# Patient Record
Sex: MALE | Race: WHITE | NOT HISPANIC OR LATINO | Employment: OTHER | URBAN - METROPOLITAN AREA
[De-identification: names, ages, dates, MRNs, and addresses within clinical notes are randomized per-mention and may not be internally consistent; named-entity substitution may affect disease eponyms.]

---

## 2019-07-29 ENCOUNTER — TELEPHONE (OUTPATIENT)
Dept: GASTROENTEROLOGY | Facility: CLINIC | Age: 84
End: 2019-07-29

## 2019-07-29 NOTE — TELEPHONE ENCOUNTER
Called pt and scheduled NP appointment for 11/18/19 at 11 am. Discussed with pt that Dr. Yanez is a consultant who will send them back to their general GI provider once their symptoms improved and plan of care is established. Pt was told the logistics of the appointment (arrival time, length of visit, total time spent at facility, and Tamika Garcia NP role). Pt was also told that Dr. Yanez will review their previous workup but may order additional test and perform her own procedures and that this may require an overnight stay at a local hot to complete the workup.

## 2019-11-15 ENCOUNTER — DOCUMENTATION ONLY (OUTPATIENT)
Dept: GASTROENTEROLOGY | Facility: CLINIC | Age: 84
End: 2019-11-15

## 2019-11-15 NOTE — PROGRESS NOTES
Record review.  Time: 10 minutes  PMH of anemia, chronic back pain, diabetes type 2, history of prostate cancer status post prostatectomy, HLD, HTN, kidney disease, CAD, CHIOMA, proximal AFib, restless leg syndrome    EGD 07/01/2019:  Long segment Munoz's with tongues of salmon-colored mucosa in the distal esophagus 34-40 cm from the teeth with squamous islands (Munoz's mucosa with focal high-grade dysplasia and ulceration)    Labs:  07/07/2019:  CBC RBC low 4.53, HGB low 12.5, RDW high 16.0 BMP BUN high 22, calcium low 8.5  06/24/2019:  CMP BUN high 26, creatinine high 1.54, GFR low 43, calcium low 8.5  06/20/2019:  CBC RBC low 4.14, HGB low 11.7, RDW high 16.3, CMP creatinine high 1.36, calcium low 8.2  06/04/2019:  CMP BUN high 30, albumin low 3.1 TSH normal

## 2019-11-18 ENCOUNTER — OFFICE VISIT (OUTPATIENT)
Dept: GASTROENTEROLOGY | Facility: CLINIC | Age: 84
End: 2019-11-18
Payer: MEDICARE

## 2019-11-18 ENCOUNTER — TELEPHONE (OUTPATIENT)
Dept: GASTROENTEROLOGY | Facility: CLINIC | Age: 84
End: 2019-11-18

## 2019-11-18 VITALS
HEIGHT: 70 IN | DIASTOLIC BLOOD PRESSURE: 67 MMHG | WEIGHT: 213.88 LBS | HEART RATE: 58 BPM | SYSTOLIC BLOOD PRESSURE: 155 MMHG | BODY MASS INDEX: 30.62 KG/M2

## 2019-11-18 DIAGNOSIS — R63.4 WEIGHT LOSS: ICD-10-CM

## 2019-11-18 DIAGNOSIS — R07.9 CHEST PAIN, UNSPECIFIED TYPE: ICD-10-CM

## 2019-11-18 DIAGNOSIS — K21.9 GASTROESOPHAGEAL REFLUX DISEASE, ESOPHAGITIS PRESENCE NOT SPECIFIED: ICD-10-CM

## 2019-11-18 DIAGNOSIS — K22.711 BARRETT'S ESOPHAGUS WITH HIGH GRADE DYSPLASIA: Primary | ICD-10-CM

## 2019-11-18 DIAGNOSIS — R10.9 ABDOMINAL PAIN, UNSPECIFIED ABDOMINAL LOCATION: ICD-10-CM

## 2019-11-18 PROCEDURE — 99999 PR PBB SHADOW E&M-EST. PATIENT-LVL IV: ICD-10-PCS | Mod: PBBFAC,,, | Performed by: NURSE PRACTITIONER

## 2019-11-18 PROCEDURE — 99204 OFFICE O/P NEW MOD 45 MIN: CPT | Mod: S$PBB,,, | Performed by: NURSE PRACTITIONER

## 2019-11-18 PROCEDURE — 99999 PR PBB SHADOW E&M-EST. PATIENT-LVL IV: CPT | Mod: PBBFAC,,, | Performed by: NURSE PRACTITIONER

## 2019-11-18 PROCEDURE — 99214 OFFICE O/P EST MOD 30 MIN: CPT | Mod: PBBFAC | Performed by: NURSE PRACTITIONER

## 2019-11-18 PROCEDURE — 99204 PR OFFICE/OUTPT VISIT, NEW, LEVL IV, 45-59 MIN: ICD-10-PCS | Mod: S$PBB,,, | Performed by: NURSE PRACTITIONER

## 2019-11-18 RX ORDER — FUROSEMIDE 40 MG/1
40 TABLET ORAL DAILY
Refills: 5 | COMMUNITY
Start: 2019-10-08

## 2019-11-18 RX ORDER — ISOSORBIDE MONONITRATE 30 MG/1
15 TABLET, EXTENDED RELEASE ORAL
COMMUNITY
Start: 2019-01-25 | End: 2020-01-20

## 2019-11-18 RX ORDER — TRAMADOL HYDROCHLORIDE 50 MG/1
TABLET ORAL
COMMUNITY
Start: 2019-06-12

## 2019-11-18 RX ORDER — HYDROCODONE BITARTRATE AND ACETAMINOPHEN 10; 325 MG/1; MG/1
TABLET ORAL
Refills: 0 | Status: ON HOLD | COMMUNITY
Start: 2019-09-20 | End: 2020-03-31 | Stop reason: SDUPTHER

## 2019-11-18 RX ORDER — ROSUVASTATIN CALCIUM 20 MG/1
TABLET, COATED ORAL
Refills: 11 | COMMUNITY
Start: 2019-09-18

## 2019-11-18 RX ORDER — ASPIRIN 81 MG/1
81 TABLET ORAL
COMMUNITY
Start: 2018-08-24

## 2019-11-18 RX ORDER — ROPINIROLE 2 MG/1
2 TABLET, FILM COATED ORAL
COMMUNITY
Start: 2018-08-07

## 2019-11-18 RX ORDER — AMLODIPINE BESYLATE 5 MG/1
TABLET ORAL
Refills: 5 | COMMUNITY
Start: 2019-11-01

## 2019-11-18 RX ORDER — GABAPENTIN 100 MG/1
CAPSULE ORAL
Refills: 5 | COMMUNITY
Start: 2019-09-09

## 2019-11-18 RX ORDER — LOSARTAN POTASSIUM 50 MG/1
TABLET ORAL
Refills: 3 | COMMUNITY
Start: 2019-11-08

## 2019-11-18 RX ORDER — FAMOTIDINE 20 MG/1
20 TABLET, FILM COATED ORAL
COMMUNITY
Start: 2018-08-07

## 2019-11-18 RX ORDER — ALBUTEROL SULFATE 0.83 MG/ML
2.5 SOLUTION RESPIRATORY (INHALATION)
COMMUNITY
Start: 2018-08-07

## 2019-11-18 RX ORDER — ROSUVASTATIN CALCIUM 20 MG/1
20 TABLET, COATED ORAL
COMMUNITY
Start: 2018-10-16

## 2019-11-18 RX ORDER — LEVETIRACETAM 250 MG/1
250 TABLET ORAL
COMMUNITY
Start: 2018-09-27

## 2019-11-18 RX ORDER — FERROUS SULFATE 325(65) MG
325 TABLET, DELAYED RELEASE (ENTERIC COATED) ORAL
COMMUNITY
Start: 2018-08-07

## 2019-11-18 RX ORDER — INSULIN GLARGINE 100 [IU]/ML
30 INJECTION, SOLUTION SUBCUTANEOUS DAILY
COMMUNITY
Start: 2018-12-05

## 2019-11-18 RX ORDER — AMIODARONE HYDROCHLORIDE 200 MG/1
TABLET ORAL
Refills: 0 | COMMUNITY
Start: 2019-10-01

## 2019-11-18 RX ORDER — HYDRALAZINE HYDROCHLORIDE 25 MG/1
25 TABLET, FILM COATED ORAL
COMMUNITY
Start: 2019-06-12

## 2019-11-18 RX ORDER — PANTOPRAZOLE SODIUM 40 MG/1
TABLET, DELAYED RELEASE ORAL
Refills: 11 | Status: ON HOLD | COMMUNITY
Start: 2019-11-01 | End: 2020-03-31 | Stop reason: SDUPTHER

## 2019-11-18 NOTE — PROGRESS NOTES
[facility] Consultation Note    Reason for Consult:    Chief Complaint   Patient presents with    Chest Pain    Abdominal Pain    Weight Loss         PCP:   Primary Doctor No   [address] [facility] / [place] [address]    Referring MD:  Alberto Carver, Do  [address] [facility] [place] [address]      HPI:  Dick Lu is a 85 y.o. male with a PMH of anemia, chronic back pain, diabetes type 2, history of prostate cancer status post prostatectomy, HLD, HTN, kidney disease, CAD, CHIOMA, proximal AFib, restless leg syndrome referred to motility clinic for second opinion regarding the following problems:    GERD.  Well controlled with pantoprazole 40 mg daily and famotidine 20 mg BID.   Onset:sevearal yrs ago, got better after he stopped smoking about 40 yrs ago, and resolved 20 yrs ago  Retrosternal pyrosis:yes  Regurgitation:yes  Belching:yes  Frequency: typical s/s no longer occurring.atypical s/s frequently, rod nightly  Hoarseness:no  Cough:yes  Throat clearing:yes  Food Triggers:none noticed  Caffeine intake:1 cup coffee/morning; may have another on seldom occasions.   Does not sleep with head of the bed elevated.   Does not avoid eating prior to bedtime.      Munoz's esophagus with high grade dysplasia.     Chest pain. After meals. Not r/t exertion. Central chest sharp pain.   Onset: spring 2019  Frequency: prev occurring intermittently( few days monthly), has not occurred this past month       Triggers (cold fluids, caffeine, smoking, ETOH):none recognized  Consumes mostly soft foods and liquids:regular.   Negative cardiac workup:  Yes. Negative w/u (EKG, ECHO, Stress test)  apart from CHF    On isosorrbide dinitrate 30 mg daily  Has not tried peppermint oil, trazodone, botox, sildenafil, diltiazem, nifedapine    Abdominal pain. Lower abd. Cramping like gas. Better if passing gas. Has not tried any meds.     Weight loss.  Reports unintentional weight loss. On lasix. Feels his  weight flucuates  Lost (lb):  About 10 lbs  Period of time: 1 yr    Anemia. RBC, HGB low, RDW high. On iron 325 mg QOD. Possibly monitored by cardiologist. Pt states he had a colonoscopy in the past.     CHIOMA. RLS. On requip 2 mg nightly. Has sleep study in the past.     Chronic back pain. On tramadol 50 mg PRN. On gabapentin 100 mg nightly. Per pcp.     Denies  nausea, vomiting, early satiety,  bloating, diarrhea, constipation, BRBPR, melena, anxiety/depression.       Total visit time was 90 minutes, more than 50% of which was spent in face-to-face counseling with patient regarding symptoms, diagnostic results, prognosis, risks and benefits of treatment options, instructions for management, importance of compliance with chosen treatment options, risk factor reduction, stress reduction, coping strategies.      Previous Studies: (I spent 10 minutes reviewing records on 11/15/19).    EGD 07/01/2019:  Long segment Munoz's with tongues of salmon-colored mucosa in the distal esophagus 34-40 cm from the teeth with squamous islands (Munoz's mucosa with focal high-grade dysplasia and ulceration)     Labs:  07/07/2019:  CBC RBC low 4.53, HGB low 12.5, RDW high 16.0 BMP BUN high 22, calcium low 8.5  06/24/2019:  CMP BUN high 26, creatinine high 1.54, GFR low 43, calcium low 8.5  06/20/2019:  CBC RBC low 4.14, HGB low 11.7, RDW high 16.3, CMP creatinine high 1.36, calcium low 8.2  06/04/2019:  CMP BUN high 30, albumin low 3.1 TSH normal      ROS:  ROS   Constitutional: No fevers, no chills, no night sweats, +weight loss  ENT: +congestion, +rhinorrhea, +chronic sinus problems  CV: +chest pain, +palpitations  Pulm: +cough, +shortness of breath  Ophtho: No blurry vision, no eye redness  GI: see HPI  Derm: No rash  Heme: No lymphadenopathy, no bruising  MSK: +joint pain, +joint swelling, no Raynauds  : No dysuria, no frequent urination, no blood in urine  Endo: +hot or cold intolerance  Neuro: +dizziness, no syncope, no  seizure  Psych: No anxiety, no depression        Medical History: History reviewed. No pertinent past medical history.     Surgical History:   Past Surgical History:   Procedure Laterality Date    APPLICATION OF SPLINT  06/18/2014    HAND SURGERY      PROSTATECTOMY      RECONSTRUCTION OF MEDIAL PATELLOFEMORAL LIGAMENT OF RIGHT KNEE      TOTAL KNEE ARTHROPLASTY Right     TOTAL KNEE ARTHROPLASTY Left     left knee joint    UPPER GASTROINTESTINAL ENDOSCOPY          Family History:   Family History   Problem Relation Age of Onset    Glaucoma Mother     Prostate cancer Father     Stroke Father     Diabetes Mellitus Brother     Diabetes Maternal Grandmother     Cirrhosis Neg Hx     Celiac disease Neg Hx     Colon cancer Neg Hx     Colon polyps Neg Hx     Crohn's disease Neg Hx     Cystic fibrosis Neg Hx     Esophageal cancer Neg Hx     Hemochromatosis Neg Hx     Inflammatory bowel disease Neg Hx     Liver cancer Neg Hx     Irritable bowel syndrome Neg Hx     Liver disease Neg Hx     Rectal cancer Neg Hx     Stomach cancer Neg Hx     Ulcerative colitis Neg Hx     Lymphoma Neg Hx     Gennaro's disease Neg Hx     Tuberculosis Neg Hx     Rheum arthritis Neg Hx     Scleroderma Neg Hx     Multiple sclerosis Neg Hx     Melanoma Neg Hx     Lupus Neg Hx     Psoriasis Neg Hx     Skin cancer Neg Hx         Social History:   Social History     Socioeconomic History    Marital status:      Spouse name: Not on file    Number of children: Not on file    Years of education: Not on file    Highest education level: Not on file   Occupational History    Not on file   Social Needs    Financial resource strain: Not on file    Food insecurity:     Worry: Not on file     Inability: Not on file    Transportation needs:     Medical: Not on file     Non-medical: Not on file   Tobacco Use    Smoking status: Former Smoker   Substance and Sexual Activity    Alcohol use: Yes     Comment: 1-2 per  week    Drug use: Not on file    Sexual activity: Not on file   Lifestyle    Physical activity:     Days per week: Not on file     Minutes per session: Not on file    Stress: Not on file   Relationships    Social connections:     Talks on phone: Not on file     Gets together: Not on file     Attends Restorationism service: Not on file     Active member of club or organization: Not on file     Attends meetings of clubs or organizations: Not on file     Relationship status: Not on file   Other Topics Concern    Not on file   Social History Narrative    Not on file        Review of patient's allergies indicates:  No Known Allergies    Current Outpatient Medications   Medication Sig Dispense Refill    albuterol (PROVENTIL) 2.5 mg /3 mL (0.083 %) nebulizer solution 2.5 mg.      amiodarone (PACERONE) 200 MG Tab TAKE 1 ORAL TABLET DAILY  0    amLODIPine (NORVASC) 5 MG tablet   5    aspirin (ECOTRIN) 81 MG EC tablet Take 81 mg by mouth.      famotidine (PEPCID) 20 MG tablet Take 20 mg by mouth.      ferrous sulfate 325 (65 FE) MG EC tablet Take 325 mg by mouth.      furosemide (LASIX) 40 MG tablet Take 40 mg by mouth once daily.  5    gabapentin (NEURONTIN) 100 MG capsule 1 CAP(S) ORAL ONE HOUR BEFORE BEDITME  5    hydrALAZINE (APRESOLINE) 25 MG tablet Take 25 mg by mouth.      HYDROcodone-acetaminophen (NORCO)  mg per tablet TAKE 1/2 TO 1 TABLET THREE TIMES A DAY 5 DAYS MAY CAUSE DROWSINESS  0    insulin glargine (LANTUS) 100 unit/mL injection Inject into the skin.      isosorbide mononitrate (IMDUR) 30 MG 24 hr tablet Take 15 mg by mouth.      levETIRAcetam (KEPPRA) 250 MG Tab Take 250 mg by mouth.      losartan (COZAAR) 50 MG tablet   3    pantoprazole (PROTONIX) 40 MG tablet   11    rivaroxaban (XARELTO) 15 mg Tab Take 15 mg by mouth.      rOPINIRole (REQUIP) 2 MG tablet Take 2 mg by mouth.      rosuvastatin (CRESTOR) 20 MG tablet TAKE 1 TABLET (20 MG TOTAL) BY MOUTH NIGHTLY.  11     "rosuvastatin (CRESTOR) 20 MG tablet Take 20 mg by mouth.      traMADol (ULTRAM) 50 mg tablet        No current facility-administered medications for this visit.         Objective Findings:  Vital Signs:  BP (!) 155/67   Pulse (!) 58   Ht 5' 10" (1.778 m)   Wt 97 kg (213 lb 13.5 oz)   BMI 30.68 kg/m²   Body mass index is 30.68 kg/m².    Physical Exam:  General appearance: alert, cooperative, no distress  HENT: Normocephalic, atraumatic, neck symmetrical, no nasal discharge, Lips, mucosa, and tongue normal; teeth and gums normal  Eyes: conjunctivae/corneas clear, EOM's intact  Lungs: CTA bilaterally in anterior and posterior fields, no wheezes, no crackles.  Heart: Regular rate and rhythm, S1, S2 normal, no murmurs heard  Abdomen: soft, non tender, non distended with positive bowel sounds in all four quadrants. No hepatosplenomegaly, ascites, or mass  Extremities: extremities symmetric; no clubbing, cyanosis, or edema  Integument: Skin color, texture, turgor normal; no rashes; hair distrubution normal  Neurologic: Alert and oriented X 3, normal strength, normal coordination and gait  Psychiatric: no pressured speech; normal affect; no evidence of impaired cognition    Labs:  No results found for: WBC, HGB, HCT, MCV, PLT  No results found for: FERRITIN  No results found for: NA, K, CL, CO2, GLU, BUN, CREATININE, CALCIUM, PROT, ALBUMIN, BILITOT, ALKPHOS, AST, ALT  No results found for: TSH  No results found for: SEDRATE  No results found for: CRP  No results found for: LABA1C, HGBA1C        Assessment and Plan:  Dick Lu is a 85 y.o. male with a PMH of anemia, chronic back pain, diabetes type 2, history of prostate cancer status post prostatectomy, HLD, HTN, kidney disease, CAD, CHIOMA, proximal AFib, restless leg syndrome referred to motility clinic for second opinion regarding the following problems:    GERD. Typical symptoms have been well controlled with pantoprazole 40 mg daily and famotidine 20 mg BID for " several years.    Atypical symptoms occur frequently, rod at night.    Does not sleep with head of the bed elevated.   Does not avoid eating prior to bedtime.    -Cont PPI and H2RA    EGD in 7/2019 with Graham's esophagus with high grade dysplasia.   -Discussed graham's esophagus pathogenesis, course, treatment options. Provided handout.   -Dr. Yanez to message [facility] for EGD w EMR/ablation with Dr. Cobb    Chest pain. Cardiac work up EKG, ECHO, Stress test with CHF; no etiology for chest pain found.  Previously occurring intermittently (few days monthly), has not occurred this past month  On isosorbide dinitrate 30 mg daily   -Can try peppermints, peppermint oil, or altoids if symptoms recur  -Will consider esophageal manometry if symptoms recur   -Has not tried peppermint oil, trazodone, botox, sildenafil, diltiazem, nifedapine    Abdominal pain. Lower abd. Cramping like gas. Better if passing gas. Has not tried any meds.   -Defer to referring GI provider    Weight loss.  Lost 10 lbs over 1 year.  On lasix. Feels his weight flucuates  -Defer to referring GI provider    Anemia. RBC, HGB low, RDW high. On iron 325 mg QOD. Possibly monitored by cardiologist. Pt states he had a colonoscopy in the past.   -Defer to referring GI provider    CHIOMA. RLS. On requip 2 mg nightly. Has sleep study in the past.     Chronic back pain. On tramadol 50 mg PRN. On gabapentin 100 mg nightly. Per pcp.     Follow up in motility as needed,(ie  if chest pain recurs or if develops dysphagia)    Patient and family advised that Dr. Yanez and I act as a consult service and do not accept patients to be their primary GI providers. Discussed that the goal of our visit is to address relevant motility problems while deferring other GI problems as well as screening and surveillance to his primary GI provider.   Discussed that he needs to continue to follow with his local primary GI provider.  Discussed that we will complete his/her workup, clarify  diagnosis and attempt to optimize his/her symptoms with intention of him/her returning to referring GI provider for long term GI care.       1. Munoz's esophagus with high grade dysplasia    2. Chest pain, unspecified type    3. Gastroesophageal reflux disease, esophagitis presence not specified    4. Abdominal pain, unspecified abdominal location    5. Weight loss          Order summary:           Thank you so much for allowing me to participate in the care of Dick Lu          Tamika Mariee, ANDREA, FNP-C    I have personally reviewed history, performed physical exam, and educated the patient.  I have reviewed and agree with today's findings and the care plan outlined by Tamika Mariee NP. PLAN: EGD w EMR w Dr. Reza marte.      Rosalie Yanez MD

## 2019-11-18 NOTE — LETTER
November 19, 2019        Alberto Carver, DO  [address] [facility]  [place] [address]             [facility]  [address]  [place] [address]  Phone: [phone]  Fax: [phone]   Patient: Dick Lu   MR Number: [id number]   YOB: 1934   Date of Visit: 11/18/2019       Dear Dr. Carver:    Thank you for referring Dick Lu to me for evaluation. Attached you will find relevant portions of my assessment and plan of care.    If you have questions, please do not hesitate to call me. I look forward to following Dick Lu along with you.    Sincerely,                  CC  No Recipients    Enclosure

## 2019-11-18 NOTE — PATIENT INSTRUCTIONS
We will contact our advanced endoscopy team to coordinate an EGD for ablation of the graham's esophagus.     You may take peppermint oil (3 drops diluted in 1/2 cup room temperature water), peppermint tea, or mints (ie altoids) three times daily before meals if the chest pain recurs.    Speak to your referring GI doctor about the abdominal pain, weight loss,  and anemia to determine if further work up (ie repeat colonoscopy) is needed.     Continue the pantoprazole and famotidine as prescribed.

## 2019-11-19 NOTE — TELEPHONE ENCOUNTER
Dr. Trevino,   This pt somehow got ref to me for esophageal issues, dysphagia.  He denies dysphagia and his chest pain resolved. He had graham's esophagus w HGD on EGD 7/2019.  Could you please perform EGD +/- EMR/ablation ASAP to evaluate this?  Thanks  Rosalie Yanez

## 2019-11-19 NOTE — TELEPHONE ENCOUNTER
MD Agnela Modi MA   Caller: Unspecified (Yesterday,  6:16 PM)             Patient need EGD with

## 2019-11-21 ENCOUNTER — TELEPHONE (OUTPATIENT)
Dept: ENDOSCOPY | Facility: HOSPITAL | Age: 84
End: 2019-11-21

## 2019-11-21 DIAGNOSIS — K22.711 BARRETT'S ESOPHAGUS WITH HIGH GRADE DYSPLASIA: Primary | ICD-10-CM

## 2019-11-21 NOTE — TELEPHONE ENCOUNTER
Patient need EGD with possible RFA vs EMR for Munoz's with HGD. Main campus.       Please sign order

## 2019-11-22 ENCOUNTER — TELEPHONE (OUTPATIENT)
Dept: ENDOSCOPY | Facility: HOSPITAL | Age: 84
End: 2019-11-22

## 2019-12-02 ENCOUNTER — TELEPHONE (OUTPATIENT)
Dept: ENDOSCOPY | Facility: HOSPITAL | Age: 84
End: 2019-12-02

## 2019-12-02 NOTE — TELEPHONE ENCOUNTER
----- Message from Eric Newman sent at 12/2/2019  9:25 AM CST -----  Contact: Patient @ [phone]  Patient returning a missed call regarding scheduling  ( system did not allow scheduling ) pls call

## 2019-12-05 ENCOUNTER — TELEPHONE (OUTPATIENT)
Dept: ENDOSCOPY | Facility: HOSPITAL | Age: 84
End: 2019-12-05

## 2019-12-06 ENCOUNTER — TELEPHONE (OUTPATIENT)
Dept: ENDOSCOPY | Facility: HOSPITAL | Age: 84
End: 2019-12-06

## 2019-12-06 NOTE — TELEPHONE ENCOUNTER
----- Message from Xavier Sullivan sent at 12/6/2019 12:01 PM CST -----  Contact: pt: [phone]  Pt is returning call to Lucy     Please contact pt: [phone]

## 2019-12-06 NOTE — TELEPHONE ENCOUNTER
----- Message from Carmel Lindo sent at 12/6/2019  9:22 AM CST -----  Contact: pt   Lucy -- pt called to schedule his EDG.  Call back # [phone].

## 2019-12-06 NOTE — TELEPHONE ENCOUNTER
----- Message from Xavier Sullivan sent at 12/6/2019 12:01 PM CST -----  Contact: pt: [phone]  Pt is returning call to Lucy     Please contact pt: [phone]

## 2020-02-05 ENCOUNTER — TELEPHONE (OUTPATIENT)
Dept: ENDOSCOPY | Facility: HOSPITAL | Age: 85
End: 2020-02-05

## 2020-02-05 NOTE — TELEPHONE ENCOUNTER
----- Message from Telma Pope sent at 2/5/2020  4:29 PM CST -----  Contact: Jaylin( Dr. Alberto Carver [facility])  Dr. Carver would like for Dr. Cobb to contact him concerning the patient. He can be reached at [phone]

## 2020-02-06 ENCOUNTER — TELEPHONE (OUTPATIENT)
Dept: ENDOSCOPY | Facility: HOSPITAL | Age: 85
End: 2020-02-06

## 2020-02-06 NOTE — TELEPHONE ENCOUNTER
Spoke with patient and wife. EGD scheduled for 2/24 at 10a pending ok to hold Xarelo. Reviewed prep instructions. Mr and Mrs Lu verbalized understanding.

## 2020-02-07 ENCOUNTER — TELEPHONE (OUTPATIENT)
Dept: ENDOSCOPY | Facility: HOSPITAL | Age: 85
End: 2020-02-07

## 2020-02-07 NOTE — TELEPHONE ENCOUNTER
Refaxed Dr Dick Ball at corrected fax number [phone] requesting permission to hold Xarelto for EGD 2/24/20.

## 2020-02-07 NOTE — TELEPHONE ENCOUNTER
MD Angela Modi MA   Caller: Unspecified (2 days ago,  4:43 PM)             Unable to reach Dr Carver.   R

## 2020-02-07 NOTE — TELEPHONE ENCOUNTER
Spoke with patient about EGD/UEMR scheduled 2/24/20 at 1000.  States his cardiologist Dr Dick Ball/[facility] in [place], [place] oversees his Xarelto.  Spoke to staff w/Dr Ball (ph [phone]) and faxed them (fx [phone]) requesting permission to hold Xarelto for 2 days prior to procedure.  Will f/u w/patient after response.

## 2020-02-10 ENCOUNTER — TELEPHONE (OUTPATIENT)
Dept: ENDOSCOPY | Facility: HOSPITAL | Age: 85
End: 2020-02-10

## 2020-02-10 NOTE — TELEPHONE ENCOUNTER
Received fax from Dr Dick Ball with cardiology records and permission to hold Xarelto for 2 days prior to EGD/UEMR scheduled 2/24/20 at 1000.  Scanned to media tab.  Spoke with patient about instructions for procedure.  Instructions mailed.

## 2020-02-24 ENCOUNTER — ANESTHESIA (OUTPATIENT)
Dept: ENDOSCOPY | Facility: HOSPITAL | Age: 85
End: 2020-02-24
Payer: MEDICARE

## 2020-02-24 ENCOUNTER — HOSPITAL ENCOUNTER (OUTPATIENT)
Facility: HOSPITAL | Age: 85
Discharge: HOME | End: 2020-02-24
Attending: INTERNAL MEDICINE | Admitting: INTERNAL MEDICINE
Payer: MEDICARE

## 2020-02-24 ENCOUNTER — ANESTHESIA EVENT (OUTPATIENT)
Dept: ENDOSCOPY | Facility: HOSPITAL | Age: 85
End: 2020-02-24
Payer: MEDICARE

## 2020-02-24 VITALS
SYSTOLIC BLOOD PRESSURE: 192 MMHG | HEART RATE: 61 BPM | WEIGHT: 202 LBS | HEIGHT: 70 IN | DIASTOLIC BLOOD PRESSURE: 91 MMHG | BODY MASS INDEX: 28.92 KG/M2 | TEMPERATURE: 98 F | OXYGEN SATURATION: 94 % | RESPIRATION RATE: 20 BRPM

## 2020-02-24 DIAGNOSIS — K22.719 BARRETT'S ESOPHAGUS WITH DYSPLASIA: ICD-10-CM

## 2020-02-24 DIAGNOSIS — K22.711 BARRETT'S ESOPHAGUS WITH HIGH GRADE DYSPLASIA: Primary | ICD-10-CM

## 2020-02-24 LAB
POCT GLUCOSE: 139 MG/DL (ref 70–110)
POCT GLUCOSE: 159 MG/DL (ref 70–110)

## 2020-02-24 PROCEDURE — 82962 GLUCOSE BLOOD TEST: CPT | Mod: 91 | Performed by: INTERNAL MEDICINE

## 2020-02-24 PROCEDURE — 82962 GLUCOSE BLOOD TEST: CPT | Performed by: INTERNAL MEDICINE

## 2020-02-24 PROCEDURE — 43259 EGD US EXAM DUODENUM/JEJUNUM: CPT | Performed by: INTERNAL MEDICINE

## 2020-02-24 PROCEDURE — 43259 EGD US EXAM DUODENUM/JEJUNUM: CPT | Mod: ,,, | Performed by: INTERNAL MEDICINE

## 2020-02-24 PROCEDURE — 37000008 HC ANESTHESIA 1ST 15 MINUTES: Performed by: INTERNAL MEDICINE

## 2020-02-24 PROCEDURE — 37000009 HC ANESTHESIA EA ADD 15 MINS: Performed by: INTERNAL MEDICINE

## 2020-02-24 PROCEDURE — 43259 PR ENDOSCOPIC ULTRASOUND EXAM: ICD-10-PCS | Mod: ,,, | Performed by: INTERNAL MEDICINE

## 2020-02-24 PROCEDURE — D9220A PRA ANESTHESIA: ICD-10-PCS | Mod: CRNA,,, | Performed by: NURSE ANESTHETIST, CERTIFIED REGISTERED

## 2020-02-24 PROCEDURE — 63600175 PHARM REV CODE 636 W HCPCS: Performed by: NURSE ANESTHETIST, CERTIFIED REGISTERED

## 2020-02-24 PROCEDURE — D9220A PRA ANESTHESIA: Mod: ANES,,, | Performed by: ANESTHESIOLOGY

## 2020-02-24 PROCEDURE — D9220A PRA ANESTHESIA: ICD-10-PCS | Mod: ANES,,, | Performed by: ANESTHESIOLOGY

## 2020-02-24 PROCEDURE — D9220A PRA ANESTHESIA: Mod: CRNA,,, | Performed by: NURSE ANESTHETIST, CERTIFIED REGISTERED

## 2020-02-24 RX ORDER — SODIUM CHLORIDE 9 MG/ML
INJECTION, SOLUTION INTRAVENOUS CONTINUOUS
Status: DISCONTINUED | OUTPATIENT
Start: 2020-02-24 | End: 2020-02-24 | Stop reason: HOSPADM

## 2020-02-24 RX ORDER — SODIUM CHLORIDE 0.9 % (FLUSH) 0.9 %
10 SYRINGE (ML) INJECTION
Status: DISCONTINUED | OUTPATIENT
Start: 2020-02-24 | End: 2020-02-24 | Stop reason: HOSPADM

## 2020-02-24 RX ORDER — PROPOFOL 10 MG/ML
VIAL (ML) INTRAVENOUS CONTINUOUS PRN
Status: DISCONTINUED | OUTPATIENT
Start: 2020-02-24 | End: 2020-02-24

## 2020-02-24 RX ORDER — PROPOFOL 10 MG/ML
VIAL (ML) INTRAVENOUS
Status: DISCONTINUED | OUTPATIENT
Start: 2020-02-24 | End: 2020-02-24

## 2020-02-24 RX ORDER — LIDOCAINE HYDROCHLORIDE 20 MG/ML
INJECTION INTRAVENOUS
Status: DISCONTINUED | OUTPATIENT
Start: 2020-02-24 | End: 2020-02-24

## 2020-02-24 RX ORDER — SODIUM CHLORIDE 9 MG/ML
INJECTION, SOLUTION INTRAVENOUS CONTINUOUS PRN
Status: DISCONTINUED | OUTPATIENT
Start: 2020-02-24 | End: 2020-02-24

## 2020-02-24 RX ADMIN — SODIUM CHLORIDE: 0.9 INJECTION, SOLUTION INTRAVENOUS at 09:02

## 2020-02-24 RX ADMIN — PROPOFOL 50 MG: 10 INJECTION, EMULSION INTRAVENOUS at 09:02

## 2020-02-24 RX ADMIN — PROPOFOL 70 MG: 10 INJECTION, EMULSION INTRAVENOUS at 09:02

## 2020-02-24 RX ADMIN — LIDOCAINE HYDROCHLORIDE 60 MG: 20 INJECTION, SOLUTION INTRAVENOUS at 09:02

## 2020-02-24 RX ADMIN — PROPOFOL 100 MCG/KG/MIN: 10 INJECTION, EMULSION INTRAVENOUS at 09:02

## 2020-02-24 NOTE — ANESTHESIA PREPROCEDURE EVALUATION
02/24/2020  Dick Lu is a 86 y.o., male.  Past Medical History:   Diagnosis Date    Abdominal pain     AF (paroxysmal atrial fibrillation)     Anemia     Munoz's esophagus with high grade dysplasia     Bilateral edema of lower extremity     CAD (coronary artery disease)     CHF (congestive heart failure)     Chronic back pain     DM2 (diabetes mellitus, type 2)     Gastric regurgitation     GERD (gastroesophageal reflux disease)     Heartburn     HLD (hyperlipidemia)     HTN (hypertension)     Kidney disease     CHIOMA (obstructive sleep apnea)     Prostate CA     Restless leg syndrome     Weight loss, unintentional      Past Surgical History:   Procedure Laterality Date    APPLICATION OF SPLINT  06/18/2014    COLONOSCOPY      ESOPHAGOGASTRODUODENOSCOPY      HAND SURGERY      PROSTATECTOMY      RECONSTRUCTION OF MEDIAL PATELLOFEMORAL LIGAMENT OF RIGHT KNEE      TOTAL KNEE ARTHROPLASTY Right     TOTAL KNEE ARTHROPLASTY Left     left knee joint         Anesthesia Evaluation    I have reviewed the Patient Summary Reports.    I have reviewed the Nursing Notes.   I have reviewed the Medications.     Review of Systems  Anesthesia Hx:  No problems with previous Anesthesia  History of prior surgery of interest to airway management or planning: Denies Family Hx of Anesthesia complications.   Denies Personal Hx of Anesthesia complications.   Social:  Non-Smoker    Cardiovascular:   Exercise tolerance: good Hypertension CAD   CHF    Pulmonary:   Sleep Apnea    Renal/:   Chronic Renal Disease, CRI    Hepatic/GI:   GERD    Endocrine:   Diabetes, type 2        Physical Exam  General:  Well nourished    Airway/Jaw/Neck:  Airway Findings: Mouth Opening: Normal Tongue: Normal  General Airway Assessment: Adult  Mallampati: I  TM Distance: Normal, at least 6 cm      Dental:  Dental Findings:  Edentulous   Chest/Lungs:  Chest/Lungs Findings: Normal Respiratory Rate     Heart/Vascular:  Heart Findings: Rate: Normal  Rhythm: Regular Rhythm        Mental Status:  Mental Status Findings:  Cooperative, Alert and Oriented         Anesthesia Plan  Type of Anesthesia, risks & benefits discussed:  Anesthesia Type:  general  Patient's Preference:   Intra-op Monitoring Plan: standard ASA monitors  Intra-op Monitoring Plan Comments:   Post Op Pain Control Plan:   Post Op Pain Control Plan Comments:   Induction:   IV  Beta Blocker:         Informed Consent: Patient understands risks and agrees with Anesthesia plan.  Questions answered. Anesthesia consent signed with patient.  ASA Score: 3     Day of Surgery Review of History & Physical:    H&P update referred to the surgeon.         Ready For Surgery From Anesthesia Perspective.

## 2020-02-24 NOTE — ANESTHESIA POSTPROCEDURE EVALUATION
Anesthesia Post Evaluation    Patient: Dick Lu    Procedure(s) Performed: Procedure(s) (LRB):  EGD (ESOPHAGOGASTRODUODENOSCOPY)/poss RFA VS EMR (N/A)  ULTRASOUND, UPPER GI TRACT, ENDOSCOPIC    Final Anesthesia Type: general    Patient location during evaluation: [facility]  Patient participation: Yes- Able to Participate  Level of consciousness: awake and alert  Post-procedure vital signs: reviewed and stable  Pain management: adequate  Airway patency: patent    PONV status at discharge: No PONV  Anesthetic complications: no      Cardiovascular status: hemodynamically stable  Respiratory status: unassisted, spontaneous ventilation and room air  Hydration status: euvolemic  Follow-up not needed.          Vitals Value Taken Time   /91 2/24/2020 10:44 AM   Temp 36.7 °C (98 °F) 2/24/2020 10:44 AM   Pulse 61 2/24/2020 10:44 AM   Resp 20 2/24/2020 10:44 AM   SpO2 94 % 2/24/2020 10:44 AM         No case tracking events are documented in the log.      Pain/Zuly Score: Zuly Score: 10 (2/24/2020 10:44 AM)

## 2020-02-24 NOTE — DISCHARGE INSTRUCTIONS
Upper GI Endoscopy    Anesthesia: General Anesthesia     You are watched continuously during your procedure by your anesthesia provider.     Youre due to have surgery. During surgery, youll be given medicine called anesthesia or anesthetic. This will keep you comfortable and pain-free. Your anesthesia provider will use general anesthesia.  What is general anesthesia?  General anesthesia puts you into a state like deep sleep. It goes into the bloodstream (IV anesthetics), into the lungs (gas anesthetics), or both. You feel nothing during the procedure. You will not remember it. During the procedure, the anesthesia provider monitors you continuously. He or she checks your heart rate and rhythm, blood pressure, breathing, and blood oxygen.  · IV anesthetics. IV anesthetics are given through an IV line in your arm. Theyre often given first. This is so you are asleep before a gas anesthetic is started. Some kinds of IV anesthetics relieve pain. Others relax you. Your doctor will decide which kind is best in your case.  · Gas anesthetics. Gas anesthetics are breathed into the lungs. They are often used to keep you asleep. They can be given through a facemask or a tube placed in your larynx or trachea (breathing tube).  ¨ If you have a facemask, your anesthesia provider will most likely place it over your nose and mouth while youre still awake. Youll breathe oxygen through the mask as your IV anesthetic is started. Gas anesthetic may be added through the mask.  ¨ If you have a tube in the larynx or trachea, it will be inserted into your throat after youre asleep.  Anesthesia tools and medicines  You will likely have:  · IV anesthetics. These are put into an IV line into your bloodstream.  · Gas anesthetics. You breathe these anesthetics into your lungs, where they pass into your bloodstream.  · Pulse oximeter. This is a small clip that is attached to the end of your finger. This measures your blood oxygen  level.  · Electrocardiography leads (electrodes). These are small sticky pads that are placed on your chest. They record your heart rate and rhythm.  · Blood pressure cuff. This reads your blood pressure.  Risks and possible complications  General anesthesia has some risks. These include:  · Breathing problems  · Nausea and vomiting  · Sore throat or hoarseness (usually temporary)  · Allergic reaction to the anesthetic  · Irregular heartbeat (rare)  · Cardiac arrest (rare)   Anesthesia safety  · Follow all instructions you are given for how long not to eat or drink before your procedure.  · Be sure your doctor knows what medicines and drugs you take. This includes over-the-counter medicines, herbs, supplements, alcohol or other drugs. You will be asked when those were last taken.  · Have an adult family member or friend drive you home after the procedure.  · For the first 24 hours after your surgery:  ¨ Do not drive or use heavy equipment.  ¨ Do not make important decisions or sign legal documents. If important decisions or signing legal documents is necessary during the first 24 hours after surgery, have a trusted family member or spouse act on your behalf.  ¨ Avoid alcohol.  ¨ Have a responsible adult stay with you. He or she can watch for problems and help keep you safe.  Date Last Reviewed: 12/1/2016  © 1526-4945 [employer]. [address] [place] [address], [place] [address]. All rights reserved. This information is not intended as a substitute for professional medical care. Always follow your healthcare professional's instructions.         During endoscopy, a long, flexible tube is used to view the inside of your upper GI tract.      Upper GI endoscopy allows your healthcare provider to look directly into the beginning of your gastrointestinal (GI) tract. The esophagus, stomach, and duodenum (the first part of the small intestine) make up the upper GI tract.   Before the exam  Follow these and any other  instructions you are given before your endoscopy. If you dont follow the healthcare providers instructions carefully, the test may need to be canceled or done over:  · Don't eat or drink anything after midnight the night before your exam. If your exam is in the afternoon, drink only clear liquids in the morning. Don't eat or drink anything for 8 hours before the exam. In some cases, you may be able to take medicines with sips of water until 2 hours before the procedure. Speak with your healthcare provider about this.   · Bring your X-rays and any other test results you have.  · Because you will be sedated, arrange for an adult to drive you home after the exam.  · Tell your healthcare provider before the exam if you are taking any medicines or have any medical problems.  The procedure  Here is what to expect:  · You will lie on the endoscopy table. Usually patients lie on the left side.  · You will be monitored and given oxygen.  · Your throat may be numbed with a spray or gargle. You are given medicine through an intravenous (IV) line that will help you relax and remain comfortable. You may be awake or asleep during the procedure.  · The healthcare provider will put the endoscope in your mouth and down your esophagus. It is thinner than most pieces of food that you swallow. It will not affect your breathing. The medicine helps keep you from gagging.  · Air is put into your GI tract to expand it. It can make you burp.  · During the procedure, the healthcare provider can take biopsies (tissue samples), remove abnormalities, such as polyps, or treat abnormalities through a variety of devices placed through the endoscope. You will not feel this.   · The endoscope carries images of your upper GI tract to a video screen. If you are awake, you may be able to look at the images.  · After the procedure is done, you will rest for a time. An adult must drive you home.  When to call your healthcare provider  Contact your  healthcare provider if you have:  · Black or tarry stools, or blood in your stool  · Fever  · Pain in your belly that does not go away  · Nausea and vomiting, or vomiting blood   Date Last Reviewed: 7/1/2016 © 2000-2017 [employer]. [address] [place] [address]. All rights reserved. This information is not intended as a substitute for professional medical care. Always follow your healthcare professional's instructions.

## 2020-02-24 NOTE — TRANSFER OF CARE
"Anesthesia Transfer of Care Note    Patient: Dick Lu    Procedure(s) Performed: Procedure(s) (LRB):  EGD (ESOPHAGOGASTRODUODENOSCOPY)/poss RFA VS EMR (N/A)  ULTRASOUND, UPPER GI TRACT, ENDOSCOPIC    Patient location: [facility]    Anesthesia Type: general    Transport from OR: Transported from OR on room air with adequate spontaneous ventilation    Post pain: adequate analgesia    Post assessment: no apparent anesthetic complications and tolerated procedure well    Post vital signs: stable    Level of consciousness: awake and alert    Nausea/Vomiting: no nausea/vomiting    Complications: none          Last vitals:   Visit Vitals  BP (!) 160/78   Pulse (!) 57   Temp 36.1 °C (97 °F) (Temporal)   Resp 16   Ht 5' 10" (1.778 m)   Wt 91.6 kg (202 lb)   SpO2 (!) 91%   BMI 28.98 kg/m²     "

## 2020-02-24 NOTE — H&P
History & Physical - Short Stay  Gastroenterology      SUBJECTIVE:     Procedure: EGD and EUS    Chief Complaint/Indication for Procedure: Esophageal cancer    History of Present Illness:  Patient is a 86 y.o. 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    amiodarone (PACERONE) 200 MG Tab TAKE 1 ORAL TABLET DAILY    amLODIPine (NORVASC) 5 MG tablet     aspirin (ECOTRIN) 81 MG EC tablet Take 81 mg by mouth.    famotidine (PEPCID) 20 MG tablet Take 20 mg by mouth.    ferrous sulfate 325 (65 FE) MG EC tablet Take 325 mg by mouth.    furosemide (LASIX) 40 MG tablet Take 40 mg by mouth once daily.    gabapentin (NEURONTIN) 100 MG capsule 1 CAP(S) ORAL ONE HOUR BEFORE BEDITME    hydrALAZINE (APRESOLINE) 25 MG tablet Take 25 mg by mouth.    HYDROcodone-acetaminophen (NORCO)  mg per tablet TAKE 1/2 TO 1 TABLET THREE TIMES A DAY 5 DAYS MAY CAUSE DROWSINESS    insulin glargine (LANTUS) 100 unit/mL injection Inject 30 Units into the skin once daily. In the morning.    levETIRAcetam (KEPPRA) 250 MG Tab Take 250 mg by mouth.    losartan (COZAAR) 50 MG tablet     pantoprazole (PROTONIX) 40 MG tablet     rOPINIRole (REQUIP) 2 MG tablet Take 2 mg by mouth.    rosuvastatin (CRESTOR) 20 MG tablet TAKE 1 TABLET (20 MG TOTAL) BY MOUTH NIGHTLY.    rosuvastatin (CRESTOR) 20 MG tablet Take 20 mg by mouth.    traMADol (ULTRAM) 50 mg tablet     isosorbide mononitrate (IMDUR) 30 MG 24 hr tablet Take 15 mg by mouth.    rivaroxaban (XARELTO) 15 mg Tab Take 15 mg by mouth.       Review of patient's allergies indicates:  No Known Allergies     Past Medical History:   Diagnosis Date    Abdominal pain     AF (paroxysmal atrial fibrillation)     Anemia     Munoz's esophagus with high grade dysplasia     Bilateral edema of  lower extremity     CAD (coronary artery disease)     CHF (congestive heart failure)     Chronic back pain     DM2 (diabetes mellitus, type 2)     Gastric regurgitation     GERD (gastroesophageal reflux disease)     Heartburn     HLD (hyperlipidemia)     HTN (hypertension)     Kidney disease     CHIOMA (obstructive sleep apnea)     Prostate CA     Restless leg syndrome     Weight loss, unintentional      Past Surgical History:   Procedure Laterality Date    APPLICATION OF SPLINT  06/18/2014    COLONOSCOPY      ESOPHAGOGASTRODUODENOSCOPY      HAND SURGERY      PROSTATECTOMY      RECONSTRUCTION OF MEDIAL PATELLOFEMORAL LIGAMENT OF RIGHT KNEE      TOTAL KNEE ARTHROPLASTY Right     TOTAL KNEE ARTHROPLASTY Left     left knee joint     Family History   Problem Relation Age of Onset    Glaucoma Mother     Prostate cancer Father     Stroke Father     Diabetes Mellitus Brother     Diabetes Maternal Grandmother     Cirrhosis Neg Hx     Celiac disease Neg Hx     Colon cancer Neg Hx     Colon polyps Neg Hx     Crohn's disease Neg Hx     Cystic fibrosis Neg Hx     Esophageal cancer Neg Hx     Hemochromatosis Neg Hx     Inflammatory bowel disease Neg Hx     Liver cancer Neg Hx     Irritable bowel syndrome Neg Hx     Liver disease Neg Hx     Rectal cancer Neg Hx     Stomach cancer Neg Hx     Ulcerative colitis Neg Hx     Lymphoma Neg Hx     Gennaro's disease Neg Hx     Tuberculosis Neg Hx     Rheum arthritis Neg Hx     Scleroderma Neg Hx     Multiple sclerosis Neg Hx     Melanoma Neg Hx     Lupus Neg Hx     Psoriasis Neg Hx     Skin cancer Neg Hx      Social History     Tobacco Use    Smoking status: Former Smoker   Substance Use Topics    Alcohol use: Yes     Comment: 1-2 per week    Drug use: Not on file       Review of Systems:  Respiratory: no cough or shortness of breath  Cardiovascular: no chest pain or palpitations    OBJECTIVE:     Vital Signs (Most Recent)  Temp: 97.9 °F  (36.6 °C) (02/24/20 0850)  Pulse: 60 (02/24/20 0850)  Resp: 16 (02/24/20 0850)  BP: (!) 199/79 (02/24/20 0850)  SpO2: (!) 94 % (02/24/20 0850)    Physical Exam:  General: well developed, well nourished  Lungs:  normal respiratory effort  Heart: regular rate, S1, S2 normal    Laboratory  CBC: No results for input(s): WBC, RBC, HGB, HCT, PLT, MCV, MCH, MCHC in the last 168 hours.  CMP: No results for input(s): GLU, CALCIUM, ALBUMIN, PROT, NA, K, CO2, CL, BUN, CREATININE, ALKPHOS, ALT, AST, BILITOT in the last 168 hours.  Coagulation: No results for input(s): LABPROT, INR, APTT in the last 168 hours.        Diagnostic Results:        ASSESSMENT/PLAN:     Esophageal cancer    Plan: EGD and EUS    Anesthesia Plan: MAC    ASA Grade: ASA 3 - Patient with moderate systemic disease with functional limitations     The impression and plan was discussed in detail with the patient. All questions have been answered and the patient voices understanding of our plan at this point. The risk of the procedure was discussed in detail which includes but not limited to bleeding, infection, perforation in some cases requiring surgery with its spectrum of complications.

## 2020-02-24 NOTE — ANESTHESIA RELEASE NOTE
"Anesthesia Release from PACU Note    Patient: Dick Lu    Procedure(s) Performed: Procedure(s) (LRB):  EGD (ESOPHAGOGASTRODUODENOSCOPY)/poss RFA VS EMR (N/A)  ULTRASOUND, UPPER GI TRACT, ENDOSCOPIC    Anesthesia type: general    Post pain: Adequate analgesia    Post assessment: no apparent anesthetic complications and tolerated procedure well    Last Vitals:   Visit Vitals  BP (!) 192/91   Pulse 61   Temp 36.7 °C (98 °F) (Temporal)   Resp 20   Ht 5' 10" (1.778 m)   Wt 91.6 kg (202 lb)   SpO2 (!) 94%   BMI 28.98 kg/m²       Post vital signs: stable    Level of consciousness: awake and alert     Nausea/Vomiting: no nausea/no vomiting    Complications: none    Airway Patency: patent    Respiratory: unassisted, spontaneous ventilation, room air    Cardiovascular: stable and blood pressure at baseline    Hydration: euvolemic  "

## 2020-02-24 NOTE — PLAN OF CARE
Discharge instructions reviewed with patient and family. Verbalizes understanding. Copies of instructions given to patient.  Tolerating po fluids. Denies pain or nausea. Safety maintained.

## 2020-02-24 NOTE — DISCHARGE SUMMARY
Discharge Summary/Instructions after an Endoscopic Procedure    Patient Name: Dick Lu  Patient MRN: [id number]  Patient YOB: 1934 Monday, February 24, 2020  Mac Cobb MD    RESTRICTIONS:  During your procedure today, you received medications for sedation.  These medications may affect your judgment, balance and coordination.  Therefore, for 24 hours, you have the following restrictions:     - DO NOT drive a car, operate machinery, make legal/financial decisions, sign important papers or drink alcohol.      ACTIVITY:  Today: no heavy lifting, straining or running due to procedural sedation/anesthesia.  The following day: return to full activity including work.    DIET:  Eat and drink normally unless instructed otherwise.     TREATMENT FOR COMMON SIDE EFFECTS:  - Mild abdominal pain, nausea, belching, bloating or excessive gas:  rest, eat lightly and use a heating pad.  - Sore Throat: treat with throat lozenges and/or gargle with warm salt water.  - Because air was used during the procedure, expelling large amounts of air from your rectum or belching is normal.  - If a bowel prep was taken, you may not have a bowel movement for 1-3 days.  This is normal.      SYMPTOMS TO WATCH FOR AND REPORT TO YOUR PHYSICIAN:  1. Abdominal pain or bloating, other than gas cramps.  2. Chest pain.  3. Back pain.  4. Signs of infection such as: chills or fever occurring within 24 hours after the procedure.  5. Rectal bleeding, which would show as bright red, maroon, or black stools. (A tablespoon of blood from the rectum is not serious, especially if hemorrhoids are present.)  6. Vomiting.  7. Weakness or dizziness.      GO DIRECTLY TO THE NEAREST EMERGENCY ROOM IF YOU HAVE ANY OF THE FOLLOWING:     Difficulty breathing              Chills and/or fever over 101 F   Persistent vomiting and/or vomiting blood   Severe abdominal pain   Severe chest pain   Black, tarry stools   Bleeding- more than one  tablespoon   Any other symptom or condition that you feel may need urgent attention    Your doctor recommends these additional instructions:  If any biopsies were taken, your doctors clinic will contact you in 1 to 2 weeks with any results.    - Discharge patient to home (ambulatory).   - [facility]. Schedule tentatively an Upper ESD.    For questions, problems or results please call your physician - Mac Cobb MD at Work:  [phone].    [facility], EMERGENCY ROOM PHONE NUMBER: [phone]    IF A COMPLICATION OR EMERGENCY SITUATION ARISES AND YOU ARE UNABLE TO REACH YOUR PHYSICIAN - GO DIRECTLY TO THE EMERGENCY ROOM.

## 2020-02-24 NOTE — [employer] PATIENT INSTRUCTIONS
Discharge Summary/Instructions after an Endoscopic Procedure  Patient Name: Dick Lu  Patient MRN: [id number]  Patient YOB: 1934 Monday, February 24, 2020  Mac Cobb MD  RESTRICTIONS:  During your procedure today, you received medications for sedation.  These   medications may affect your judgment, balance and coordination.  Therefore,   for 24 hours, you have the following restrictions:   - DO NOT drive a car, operate machinery, make legal/financial decisions,   sign important papers or drink alcohol.    ACTIVITY:  Today: no heavy lifting, straining or running due to procedural   sedation/anesthesia.  The following day: return to full activity including work.  DIET:  Eat and drink normally unless instructed otherwise.     TREATMENT FOR COMMON SIDE EFFECTS:  - Mild abdominal pain, nausea, belching, bloating or excessive gas:  rest,   eat lightly and use a heating pad.  - Sore Throat: treat with throat lozenges and/or gargle with warm salt   water.  - Because air was used during the procedure, expelling large amounts of air   from your rectum or belching is normal.  - If a bowel prep was taken, you may not have a bowel movement for 1-3 days.    This is normal.  SYMPTOMS TO WATCH FOR AND REPORT TO YOUR PHYSICIAN:  1. Abdominal pain or bloating, other than gas cramps.  2. Chest pain.  3. Back pain.  4. Signs of infection such as: chills or fever occurring within 24 hours   after the procedure.  5. Rectal bleeding, which would show as bright red, maroon, or black stools.   (A tablespoon of blood from the rectum is not serious, especially if   hemorrhoids are present.)  6. Vomiting.  7. Weakness or dizziness.  GO DIRECTLY TO THE NEAREST EMERGENCY ROOM IF YOU HAVE ANY OF THE FOLLOWING:      Difficulty breathing              Chills and/or fever over 101 F   Persistent vomiting and/or vomiting blood   Severe abdominal pain   Severe chest pain   Black, tarry stools   Bleeding- more than one  tablespoon   Any other symptom or condition that you feel may need urgent attention  Your doctor recommends these additional instructions:  If any biopsies were taken, your doctors clinic will contact you in 1 to 2   weeks with any results.  - Discharge patient to home (ambulatory).   - Present at the [facility] for possible ESD. Schedule tentatively an Upper ESD.  For questions, problems or results please call your physician - Mac Cobb MD at Work:  [phone].  [facility], EMERGENCY ROOM PHONE NUMBER: [phone]  IF A COMPLICATION OR EMERGENCY SITUATION ARISES AND YOU ARE UNABLE TO REACH   YOUR PHYSICIAN - GO DIRECTLY TO THE EMERGENCY ROOM.  Mac Cobb MD  2/24/2020 10:10:48 AM  This report has been verified and signed electronically.  [employer]

## 2020-03-02 ENCOUNTER — TELEPHONE (OUTPATIENT)
Dept: ENDOSCOPY | Facility: HOSPITAL | Age: 85
End: 2020-03-02

## 2020-03-02 ENCOUNTER — TELEPHONE (OUTPATIENT)
Dept: GASTROENTEROLOGY | Facility: CLINIC | Age: 85
End: 2020-03-02

## 2020-03-02 ENCOUNTER — TUMOR BOARD CONFERENCE (OUTPATIENT)
Dept: SURGERY | Facility: CLINIC | Age: 85
End: 2020-03-02

## 2020-03-02 DIAGNOSIS — C15.5 MALIGNANT NEOPLASM OF LOWER THIRD OF ESOPHAGUS: ICD-10-CM

## 2020-03-02 DIAGNOSIS — C15.9 MALIGNANT NEOPLASM OF ESOPHAGUS, UNSPECIFIED LOCATION: Primary | ICD-10-CM

## 2020-03-02 NOTE — PROGRESS NOTES
[facility] UGI MULTIDISCIPLINARY TUMOR BOARD  PATIENT REVIEW FORM   ____________________________________________________________    CLINIC #: [id number]  DATE: 3/2/2020    DIAGNOSIS: esophageal YELITZA    PRESENTER: Reza    PATIENT SUMMARY:   This 87 y/o gentleman with prior hx of prostate CA. 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 EGD with bx revealed lower 1/3 esophageal mass, bx proved YELITZA. Recent EUS found mass at 37-40cm, malignant appearing mass, staged uT1N0.    BOARD RECOMMENDATIONS:   Proceed with ESD    CONSULT NEEDED:     [] Surgery    [] Hem/Onc    [] Rad/Onc    [] Dietary                 [] Social Service    [] Psychology       [x] AES  [] Radiology     Clinical Stage: Tumor 2  Node(s) 0  Metastasis 0     GROUP STAGE:  [] O    [x] 1   [] IIA    [] IIB     [] IIIA     [] IIIB     [] IIIC    []IV                               [] Local recurrence     [] Regional recurrence     [] Distant recurrence Metastatic site(s): none         [x] [facility] Treatment Guidelines reviewed and care planned is consistent with guidelines.         (i.e., NCCN, NCI, PD, ACO, AUA, etc.)    PRESENTATION AT CANCER CONFERENCE:         [x] Prospective    [] Retrospective     [] Follow-Up

## 2020-03-03 NOTE — TELEPHONE ENCOUNTER
MD Angela Modi MA   Caller: Unspecified (Yesterday,  4:24 PM)             3 hours will be okay.   R

## 2020-03-06 ENCOUNTER — TELEPHONE (OUTPATIENT)
Dept: ENDOSCOPY | Facility: HOSPITAL | Age: 85
End: 2020-03-06

## 2020-03-10 ENCOUNTER — TELEPHONE (OUTPATIENT)
Dept: ENDOSCOPY | Facility: HOSPITAL | Age: 85
End: 2020-03-10

## 2020-03-10 NOTE — TELEPHONE ENCOUNTER
Spoke with patient about instructions for EGD/UESD scheduled 3/31/20 at 0900.  Instructions mailed.  He will hold Xarelto for 2 days prior to procedure as before with permission from Dr Madi Ball.

## 2020-03-22 PROBLEM — C15.5 MALIGNANT NEOPLASM OF LOWER THIRD OF ESOPHAGUS: Status: ACTIVE | Noted: 2020-03-02

## 2020-03-31 ENCOUNTER — HOSPITAL ENCOUNTER (OUTPATIENT)
Facility: HOSPITAL | Age: 85
Discharge: HOME | End: 2020-03-31
Attending: INTERNAL MEDICINE | Admitting: INTERNAL MEDICINE
Payer: MEDICARE

## 2020-03-31 ENCOUNTER — TELEPHONE (OUTPATIENT)
Dept: ENDOSCOPY | Facility: HOSPITAL | Age: 85
End: 2020-03-31

## 2020-03-31 ENCOUNTER — ANESTHESIA (OUTPATIENT)
Dept: ENDOSCOPY | Facility: HOSPITAL | Age: 85
End: 2020-03-31
Payer: MEDICARE

## 2020-03-31 ENCOUNTER — ANESTHESIA EVENT (OUTPATIENT)
Dept: ENDOSCOPY | Facility: HOSPITAL | Age: 85
End: 2020-03-31
Payer: MEDICARE

## 2020-03-31 VITALS
WEIGHT: 205 LBS | SYSTOLIC BLOOD PRESSURE: 113 MMHG | RESPIRATION RATE: 16 BRPM | OXYGEN SATURATION: 96 % | TEMPERATURE: 98 F | BODY MASS INDEX: 29.35 KG/M2 | HEIGHT: 70 IN | DIASTOLIC BLOOD PRESSURE: 57 MMHG | HEART RATE: 64 BPM

## 2020-03-31 DIAGNOSIS — C15.9 ESOPHAGEAL CANCER: ICD-10-CM

## 2020-03-31 DIAGNOSIS — C15.9 MALIGNANT NEOPLASM OF ESOPHAGUS, UNSPECIFIED LOCATION: Primary | ICD-10-CM

## 2020-03-31 DIAGNOSIS — C15.5 MALIGNANT NEOPLASM OF LOWER THIRD OF ESOPHAGUS: ICD-10-CM

## 2020-03-31 DIAGNOSIS — K22.719 BARRETT'S ESOPHAGUS WITH DYSPLASIA: Primary | ICD-10-CM

## 2020-03-31 DIAGNOSIS — C15.5 MALIGNANT NEOPLASM OF LOWER THIRD OF ESOPHAGUS: Primary | ICD-10-CM

## 2020-03-31 LAB
POCT GLUCOSE: 133 MG/DL (ref 70–110)
POCT GLUCOSE: 170 MG/DL (ref 70–110)

## 2020-03-31 PROCEDURE — 43499 UNLISTED PROCEDURE ESOPHAGUS: CPT | Performed by: INTERNAL MEDICINE

## 2020-03-31 PROCEDURE — D9220A PRA ANESTHESIA: ICD-10-PCS | Mod: CRNA,,, | Performed by: NURSE ANESTHETIST, CERTIFIED REGISTERED

## 2020-03-31 PROCEDURE — D9220A PRA ANESTHESIA: Mod: ANES,,, | Performed by: ANESTHESIOLOGY

## 2020-03-31 PROCEDURE — D9220A PRA ANESTHESIA: ICD-10-PCS | Mod: ANES,,, | Performed by: ANESTHESIOLOGY

## 2020-03-31 PROCEDURE — 88305 TISSUE EXAM BY PATHOLOGIST: CPT | Mod: 26,,, | Performed by: PATHOLOGY

## 2020-03-31 PROCEDURE — 27200967 HC COAGRASPER: Performed by: INTERNAL MEDICINE

## 2020-03-31 PROCEDURE — 27202363 HC INJECTION AGENT, SUBMUCOSAL, ANY: Performed by: INTERNAL MEDICINE

## 2020-03-31 PROCEDURE — 27201243: Performed by: INTERNAL MEDICINE

## 2020-03-31 PROCEDURE — 88341 IMHCHEM/IMCYTCHM EA ADD ANTB: CPT | Mod: 26,,, | Performed by: PATHOLOGY

## 2020-03-31 PROCEDURE — 88342 CHG IMMUNOCYTOCHEMISTRY: ICD-10-PCS | Mod: 26,,, | Performed by: PATHOLOGY

## 2020-03-31 PROCEDURE — 27201028 HC NEEDLE, SCLERO: Performed by: INTERNAL MEDICINE

## 2020-03-31 PROCEDURE — 82962 GLUCOSE BLOOD TEST: CPT | Mod: 91 | Performed by: INTERNAL MEDICINE

## 2020-03-31 PROCEDURE — 63600175 PHARM REV CODE 636 W HCPCS: Performed by: NURSE ANESTHETIST, CERTIFIED REGISTERED

## 2020-03-31 PROCEDURE — 63600175 PHARM REV CODE 636 W HCPCS: Mod: JG | Performed by: INTERNAL MEDICINE

## 2020-03-31 PROCEDURE — 43999 UNLISTED PROCEDURE STOMACH: CPT | Mod: ,,, | Performed by: INTERNAL MEDICINE

## 2020-03-31 PROCEDURE — 27202299 HC NEEDLE KNIFE, TISSUE RESECTION: Performed by: INTERNAL MEDICINE

## 2020-03-31 PROCEDURE — 37000009 HC ANESTHESIA EA ADD 15 MINS: Performed by: INTERNAL MEDICINE

## 2020-03-31 PROCEDURE — 88342 IMHCHEM/IMCYTCHM 1ST ANTB: CPT | Mod: 26,,, | Performed by: PATHOLOGY

## 2020-03-31 PROCEDURE — 88305 TISSUE EXAM BY PATHOLOGIST: ICD-10-PCS | Mod: 26,,, | Performed by: PATHOLOGY

## 2020-03-31 PROCEDURE — 25000003 PHARM REV CODE 250: Performed by: NURSE ANESTHETIST, CERTIFIED REGISTERED

## 2020-03-31 PROCEDURE — 82962 GLUCOSE BLOOD TEST: CPT | Performed by: INTERNAL MEDICINE

## 2020-03-31 PROCEDURE — 43999 PR ENDOSCOPIC SUBMUCOSAL DISSECTION - EGD: ICD-10-PCS | Mod: ,,, | Performed by: INTERNAL MEDICINE

## 2020-03-31 PROCEDURE — 37000008 HC ANESTHESIA 1ST 15 MINUTES: Performed by: INTERNAL MEDICINE

## 2020-03-31 PROCEDURE — 94761 N-INVAS EAR/PLS OXIMETRY MLT: CPT

## 2020-03-31 PROCEDURE — 88341 IMHCHEM/IMCYTCHM EA ADD ANTB: CPT | Performed by: PATHOLOGY

## 2020-03-31 PROCEDURE — 88305 TISSUE EXAM BY PATHOLOGIST: CPT | Performed by: PATHOLOGY

## 2020-03-31 PROCEDURE — 88342 IMHCHEM/IMCYTCHM 1ST ANTB: CPT | Performed by: PATHOLOGY

## 2020-03-31 PROCEDURE — 27201042 HC RETRIEVAL NET: Performed by: INTERNAL MEDICINE

## 2020-03-31 PROCEDURE — D9220A PRA ANESTHESIA: Mod: CRNA,,, | Performed by: NURSE ANESTHETIST, CERTIFIED REGISTERED

## 2020-03-31 PROCEDURE — 88341 PR IHC OR ICC EACH ADD'L SINGLE ANTIBODY  STAINPR: ICD-10-PCS | Mod: 26,,, | Performed by: PATHOLOGY

## 2020-03-31 PROCEDURE — 63600175 PHARM REV CODE 636 W HCPCS: Performed by: INTERNAL MEDICINE

## 2020-03-31 RX ORDER — PANTOPRAZOLE SODIUM 40 MG/1
40 TABLET, DELAYED RELEASE ORAL 2 TIMES DAILY
Qty: 60 TABLET | Refills: 2 | Status: SHIPPED | OUTPATIENT
Start: 2020-03-31 | End: 2020-06-29

## 2020-03-31 RX ORDER — SODIUM CHLORIDE 0.9 % (FLUSH) 0.9 %
3 SYRINGE (ML) INJECTION
Status: DISCONTINUED | OUTPATIENT
Start: 2020-03-31 | End: 2020-03-31 | Stop reason: HOSPADM

## 2020-03-31 RX ORDER — GLYCOPYRROLATE 0.2 MG/ML
INJECTION INTRAMUSCULAR; INTRAVENOUS
Status: DISCONTINUED | OUTPATIENT
Start: 2020-03-31 | End: 2020-03-31

## 2020-03-31 RX ORDER — LIDOCAINE HYDROCHLORIDE 20 MG/ML
INJECTION INTRAVENOUS
Status: DISCONTINUED | OUTPATIENT
Start: 2020-03-31 | End: 2020-03-31

## 2020-03-31 RX ORDER — HYDROCODONE BITARTRATE AND ACETAMINOPHEN 10; 325 MG/1; MG/1
TABLET ORAL
Qty: 15 TABLET | Refills: 0 | Status: SHIPPED | OUTPATIENT
Start: 2020-03-31

## 2020-03-31 RX ORDER — PROPOFOL 10 MG/ML
VIAL (ML) INTRAVENOUS
Status: DISCONTINUED | OUTPATIENT
Start: 2020-03-31 | End: 2020-03-31

## 2020-03-31 RX ORDER — SODIUM CHLORIDE 9 MG/ML
INJECTION, SOLUTION INTRAVENOUS CONTINUOUS
Status: DISCONTINUED | OUTPATIENT
Start: 2020-03-31 | End: 2020-03-31 | Stop reason: HOSPADM

## 2020-03-31 RX ORDER — PHENYLEPHRINE HYDROCHLORIDE 10 MG/ML
INJECTION INTRAVENOUS
Status: DISCONTINUED | OUTPATIENT
Start: 2020-03-31 | End: 2020-03-31

## 2020-03-31 RX ORDER — EPINEPHRINE 1 MG/ML
INJECTION, SOLUTION INTRACARDIAC; INTRAMUSCULAR; INTRAVENOUS; SUBCUTANEOUS
Status: COMPLETED | OUTPATIENT
Start: 2020-03-31 | End: 2020-03-31

## 2020-03-31 RX ORDER — SUCRALFATE 1 G/1
TABLET ORAL
Qty: 60 TABLET | Refills: 0 | Status: SHIPPED | OUTPATIENT
Start: 2020-03-31

## 2020-03-31 RX ORDER — ROCURONIUM BROMIDE 10 MG/ML
INJECTION, SOLUTION INTRAVENOUS
Status: DISCONTINUED | OUTPATIENT
Start: 2020-03-31 | End: 2020-03-31

## 2020-03-31 RX ORDER — NEOSTIGMINE METHYLSULFATE 0.5 MG/ML
INJECTION, SOLUTION INTRAVENOUS
Status: DISCONTINUED | OUTPATIENT
Start: 2020-03-31 | End: 2020-03-31

## 2020-03-31 RX ORDER — SODIUM CHLORIDE 0.9 % (FLUSH) 0.9 %
10 SYRINGE (ML) INJECTION
Status: DISCONTINUED | OUTPATIENT
Start: 2020-03-31 | End: 2020-03-31 | Stop reason: HOSPADM

## 2020-03-31 RX ORDER — FENTANYL CITRATE 50 UG/ML
INJECTION, SOLUTION INTRAMUSCULAR; INTRAVENOUS
Status: DISCONTINUED | OUTPATIENT
Start: 2020-03-31 | End: 2020-03-31

## 2020-03-31 RX ORDER — SUCCINYLCHOLINE CHLORIDE 20 MG/ML
INJECTION INTRAMUSCULAR; INTRAVENOUS
Status: DISCONTINUED | OUTPATIENT
Start: 2020-03-31 | End: 2020-03-31

## 2020-03-31 RX ORDER — EPHEDRINE SULFATE 50 MG/ML
INJECTION, SOLUTION INTRAVENOUS
Status: DISCONTINUED | OUTPATIENT
Start: 2020-03-31 | End: 2020-03-31

## 2020-03-31 RX ORDER — ONDANSETRON 2 MG/ML
INJECTION INTRAMUSCULAR; INTRAVENOUS
Status: DISCONTINUED | OUTPATIENT
Start: 2020-03-31 | End: 2020-03-31

## 2020-03-31 RX ORDER — METHYLENE BLUE 5 MG/ML
INJECTION INTRAVENOUS
Status: COMPLETED | OUTPATIENT
Start: 2020-03-31 | End: 2020-03-31

## 2020-03-31 RX ADMIN — SODIUM CHLORIDE, SODIUM GLUCONATE, SODIUM ACETATE, POTASSIUM CHLORIDE, MAGNESIUM CHLORIDE, SODIUM PHOSPHATE, DIBASIC, AND POTASSIUM PHOSPHATE: .53; .5; .37; .037; .03; .012; .00082 INJECTION, SOLUTION INTRAVENOUS at 12:03

## 2020-03-31 RX ADMIN — ROCURONIUM BROMIDE 5 MG: 10 INJECTION, SOLUTION INTRAVENOUS at 11:03

## 2020-03-31 RX ADMIN — ROCURONIUM BROMIDE 25 MG: 10 INJECTION, SOLUTION INTRAVENOUS at 11:03

## 2020-03-31 RX ADMIN — SUCCINYLCHOLINE CHLORIDE 100 MG: 20 INJECTION, SOLUTION INTRAMUSCULAR; INTRAVENOUS at 11:03

## 2020-03-31 RX ADMIN — EPHEDRINE SULFATE 5 MG: 50 INJECTION INTRAVENOUS at 11:03

## 2020-03-31 RX ADMIN — METHYLENE BLUE 0.2 MG: 5 INJECTION INTRAVENOUS at 01:03

## 2020-03-31 RX ADMIN — FENTANYL CITRATE 25 MCG: 50 INJECTION, SOLUTION INTRAMUSCULAR; INTRAVENOUS at 11:03

## 2020-03-31 RX ADMIN — ONDANSETRON 4 MG: 2 INJECTION INTRAMUSCULAR; INTRAVENOUS at 12:03

## 2020-03-31 RX ADMIN — GLYCOPYRROLATE 0.4 MG: 0.2 INJECTION, SOLUTION INTRAMUSCULAR; INTRAVENOUS at 12:03

## 2020-03-31 RX ADMIN — PHENYLEPHRINE HYDROCHLORIDE 100 MCG: 10 INJECTION INTRAVENOUS at 11:03

## 2020-03-31 RX ADMIN — PROPOFOL 100 MG: 10 INJECTION, EMULSION INTRAVENOUS at 11:03

## 2020-03-31 RX ADMIN — NEOSTIGMINE METHYLSULFATE 3 MG: 0.5 INJECTION INTRAVENOUS at 12:03

## 2020-03-31 RX ADMIN — FENTANYL CITRATE 25 MCG: 50 INJECTION, SOLUTION INTRAMUSCULAR; INTRAVENOUS at 12:03

## 2020-03-31 RX ADMIN — ROCURONIUM BROMIDE 10 MG: 10 INJECTION, SOLUTION INTRAVENOUS at 11:03

## 2020-03-31 RX ADMIN — EPINEPHRINE 0.2 MG: 1 INJECTION, SOLUTION INTRAMUSCULAR; SUBCUTANEOUS at 12:03

## 2020-03-31 RX ADMIN — LIDOCAINE HYDROCHLORIDE 100 MG: 20 INJECTION, SOLUTION INTRAVENOUS at 11:03

## 2020-03-31 RX ADMIN — SODIUM CHLORIDE: 0.9 INJECTION, SOLUTION INTRAVENOUS at 10:03

## 2020-03-31 NOTE — H&P
History & Physical - Short Stay  Gastroenterology      SUBJECTIVE:     Procedure: EGD    Chief Complaint/Indication for Procedure: Esophageal cancer    History of Present Illness:  Patient is a 86 y.o. male presents with esophageal cancer which appear superficial here for possible ESD.     PTA Medications   Medication Sig    albuterol (PROVENTIL) 2.5 mg /3 mL (0.083 %) nebulizer solution 2.5 mg.    amiodarone (PACERONE) 200 MG Tab TAKE 1 ORAL TABLET DAILY    amLODIPine (NORVASC) 5 MG tablet     aspirin (ECOTRIN) 81 MG EC tablet Take 81 mg by mouth.    famotidine (PEPCID) 20 MG tablet Take 20 mg by mouth.    ferrous sulfate 325 (65 FE) MG EC tablet Take 325 mg by mouth.    furosemide (LASIX) 40 MG tablet Take 40 mg by mouth once daily.    gabapentin (NEURONTIN) 100 MG capsule 1 CAP(S) ORAL ONE HOUR BEFORE BEDITME    hydrALAZINE (APRESOLINE) 25 MG tablet Take 25 mg by mouth.    HYDROcodone-acetaminophen (NORCO)  mg per tablet TAKE 1/2 TO 1 TABLET THREE TIMES A DAY 5 DAYS MAY CAUSE DROWSINESS    insulin glargine (LANTUS) 100 unit/mL injection Inject 30 Units into the skin once daily. In the morning.    levETIRAcetam (KEPPRA) 250 MG Tab Take 250 mg by mouth.    losartan (COZAAR) 50 MG tablet     pantoprazole (PROTONIX) 40 MG tablet     rOPINIRole (REQUIP) 2 MG tablet Take 2 mg by mouth.    rosuvastatin (CRESTOR) 20 MG tablet TAKE 1 TABLET (20 MG TOTAL) BY MOUTH NIGHTLY.    rosuvastatin (CRESTOR) 20 MG tablet Take 20 mg by mouth.    traMADol (ULTRAM) 50 mg tablet     isosorbide mononitrate (IMDUR) 30 MG 24 hr tablet Take 15 mg by mouth.    rivaroxaban (XARELTO) 15 mg Tab Take 15 mg by mouth.       Review of patient's allergies indicates:  No Known Allergies     Past Medical History:   Diagnosis Date    Abdominal pain     AF (paroxysmal atrial fibrillation)     Anemia     Munoz's esophagus with high grade dysplasia     Bilateral edema of lower extremity     CAD (coronary artery disease)      CHF (congestive heart failure)     Cholelithiasis     Chronic back pain     DM2 (diabetes mellitus, type 2)     Esophageal cancer     Gastric regurgitation     GERD (gastroesophageal reflux disease)     Heartburn     Hiatal hernia     HLD (hyperlipidemia)     HTN (hypertension)     Kidney disease     CHIOMA (obstructive sleep apnea)     Prostate CA     Restless leg syndrome     Weight loss, unintentional      Past Surgical History:   Procedure Laterality Date    APPLICATION OF SPLINT  06/18/2014    COLONOSCOPY      ENDOSCOPIC ULTRASOUND OF UPPER GASTROINTESTINAL TRACT  2/24/2020    Procedure: ULTRASOUND, UPPER GI TRACT, ENDOSCOPIC;  Surgeon: Mac Cobb MD;  Location: [facility] ([address] [place] [address]);  Service: Endoscopy;;    ESOPHAGOGASTRODUODENOSCOPY      ESOPHAGOGASTRODUODENOSCOPY N/A 2/24/2020    Procedure: EGD (ESOPHAGOGASTRODUODENOSCOPY)/poss RFA VS EMR;  Surgeon: Mac Cobb MD;  Location: [facility] ([address] [place] [address]);  Service: Endoscopy;  Laterality: N/A;  EGD with possible RFA vs EMR for Munoz's with HGD. Main campus.  Dr Cobb  2 day hold Dr Madi Junior - pg (cards records under media tab)    HAND SURGERY      PROSTATECTOMY      RECONSTRUCTION OF MEDIAL PATELLOFEMORAL LIGAMENT OF RIGHT KNEE      TOTAL KNEE ARTHROPLASTY Right     TOTAL KNEE ARTHROPLASTY Left     left knee joint     Family History   Problem Relation Age of Onset    Glaucoma Mother     Prostate cancer Father     Stroke Father     Diabetes Mellitus Brother     Diabetes Maternal Grandmother     Cirrhosis Neg Hx     Celiac disease Neg Hx     Colon cancer Neg Hx     Colon polyps Neg Hx     Crohn's disease Neg Hx     Cystic fibrosis Neg Hx     Esophageal cancer Neg Hx     Hemochromatosis Neg Hx     Inflammatory bowel disease Neg Hx     Liver cancer Neg Hx     Irritable bowel syndrome Neg Hx     Liver disease Neg Hx     Rectal cancer Neg Hx     Stomach cancer Neg Hx     Ulcerative colitis Neg Hx      Lymphoma Neg Hx     Gennaro's disease Neg Hx     Tuberculosis Neg Hx     Rheum arthritis Neg Hx     Scleroderma Neg Hx     Multiple sclerosis Neg Hx     Melanoma Neg Hx     Lupus Neg Hx     Psoriasis Neg Hx     Skin cancer Neg Hx      Social History     Tobacco Use    Smoking status: Former Smoker     Last attempt to quit: 3/31/1970     Years since quittin.0   Substance Use Topics    Alcohol use: Yes     Comment: 1-2 per week    Drug use: Not on file       Review of Systems:  Respiratory: no cough or shortness of breath  Cardiovascular: no chest pain or palpitations    OBJECTIVE:     Vital Signs (Most Recent)  Temp: 98.1 °F (36.7 °C) (20)  Pulse: 67 (20)  Resp: 16 (20)  BP: 138/61 (20)  SpO2: (!) 94 % (20)    Physical Exam:  General: well developed, well nourished  Lungs:  normal respiratory effort  Heart: regular rate, S1, S2 normal    Laboratory  CBC: No results for input(s): WBC, RBC, HGB, HCT, PLT, MCV, MCH, MCHC in the last 168 hours.  CMP: No results for input(s): GLU, CALCIUM, ALBUMIN, PROT, NA, K, CO2, CL, BUN, CREATININE, ALKPHOS, ALT, AST, BILITOT in the last 168 hours.  Coagulation: No results for input(s): LABPROT, INR, APTT in the last 168 hours.      Diagnostic Results:      ASSESSMENT/PLAN:     Esophageal cancer    Plan: EGD and possible ESD.    Anesthesia Plan: General    ASA Grade: ASA 3 - Patient with moderate systemic disease with functional limitations     The impression and plan was discussed in detail with the patient. All questions have been answered and the patient voices understanding of our plan at this point. The risk of the procedure was discussed in detail which includes but not limited to bleeding, infection, perforation in some cases requiring surgery with its spectrum of complications.

## 2020-03-31 NOTE — ANESTHESIA PREPROCEDURE EVALUATION
03/31/2020  Dick Lu is a 86 y.o., male.    Anesthesia Evaluation    I have reviewed the Patient Summary Reports.    I have reviewed the Nursing Notes.   I have reviewed the Medications.     Review of Systems  Anesthesia Hx:  No problems with previous Anesthesia  History of prior surgery of interest to airway management or planning: Denies Family Hx of Anesthesia complications.   Denies Personal Hx of Anesthesia complications.   Cardiovascular:   Hypertension Denies MI. CAD   Dysrhythmias atrial fibrillation CHF ECG has been reviewed. CHF   Pulmonary:   Denies COPD. Sleep Apnea    Renal/:   Chronic Renal Disease    Hepatic/GI:   Hiatal Hernia, GERD Esophageal cancer   Musculoskeletal:  Musculoskeletal Normal    Neurological:  Neurology Normal Denies Seizures.    Endocrine:   Diabetes        Physical Exam  General:  Obesity    Airway/Jaw/Neck:  Airway Findings: Mouth Opening: Normal Tongue: Normal  General Airway Assessment: Adult  Mallampati: II  TM Distance: Normal, at least 6 cm  Jaw/Neck Findings:  Micrognathia: Negative Neck ROM: Normal ROM      Dental:  Dental Findings: Edentulous   Chest/Lungs:  Chest/Lungs Findings: Clear to auscultation, Normal Respiratory Rate     Heart/Vascular:  Heart Findings: Rate: Normal  Rhythm: Regular Rhythm  Sounds: Normal  Heart murmur: negative    Abdomen:  Abdomen Findings:  Normal, Nontender, Soft       Mental Status:  Mental Status Findings:  Cooperative, Alert and Oriented         Anesthesia Plan  Type of Anesthesia, risks & benefits discussed:  Anesthesia Type:  MAC, general  Patient's Preference:   Intra-op Monitoring Plan:   Intra-op Monitoring Plan Comments:   Post Op Pain Control Plan: multimodal analgesia, IV/PO Opioids PRN and per primary service following discharge from PACU  Post Op Pain Control Plan Comments:   Induction:   IV  Beta Blocker:  Patient  is not currently on a Beta-Blocker (No further documentation required).       Informed Consent: Patient understands risks and agrees with Anesthesia plan.  Questions answered. Anesthesia consent signed with patient.  ASA Score: 3     Day of Surgery Review of History & Physical:    H&P update referred to the surgeon.         Ready For Surgery From Anesthesia Perspective.

## 2020-03-31 NOTE — [employer] PATIENT INSTRUCTIONS
Discharge Summary/Instructions after an Endoscopic Procedure  Patient Name: Dick Lu  Patient MRN: [id number]  Patient YOB: 1934 Tuesday, March 31, 2020  Mac Cobb MD  RESTRICTIONS:  During your procedure today, you received medications for sedation.  These   medications may affect your judgment, balance and coordination.  Therefore,   for 24 hours, you have the following restrictions:   - DO NOT drive a car, operate machinery, make legal/financial decisions,   sign important papers or drink alcohol.    ACTIVITY:  Today: no heavy lifting, straining or running due to procedural   sedation/anesthesia.  The following day: return to full activity including work.  DIET:  Eat and drink normally unless instructed otherwise.     TREATMENT FOR COMMON SIDE EFFECTS:  - Mild abdominal pain, nausea, belching, bloating or excessive gas:  rest,   eat lightly and use a heating pad.  - Sore Throat: treat with throat lozenges and/or gargle with warm salt   water.  - Because air was used during the procedure, expelling large amounts of air   from your rectum or belching is normal.  - If a bowel prep was taken, you may not have a bowel movement for 1-3 days.    This is normal.  SYMPTOMS TO WATCH FOR AND REPORT TO YOUR PHYSICIAN:  1. Abdominal pain or bloating, other than gas cramps.  2. Chest pain.  3. Back pain.  4. Signs of infection such as: chills or fever occurring within 24 hours   after the procedure.  5. Rectal bleeding, which would show as bright red, maroon, or black stools.   (A tablespoon of blood from the rectum is not serious, especially if   hemorrhoids are present.)  6. Vomiting.  7. Weakness or dizziness.  GO DIRECTLY TO THE NEAREST EMERGENCY ROOM IF YOU HAVE ANY OF THE FOLLOWING:      Difficulty breathing              Chills and/or fever over 101 F   Persistent vomiting and/or vomiting blood   Severe abdominal pain   Severe chest pain   Black, tarry stools   Bleeding- more than one  tablespoon   Any other symptom or condition that you feel may need urgent attention  Your doctor recommends these additional instructions:  If any biopsies were taken, your doctors clinic will contact you in 1 to 2   weeks with any results.  - Discharge patient to home (ambulatory).   - Mechanical soft diet today, then advance as tolerated to advance diet as   tolerated.   - Use Prilosec (omeprazole) 40 mg PO BID.   - Use sucralfate suspension 1 gram PO BID.   - Await pathology results.   - Repeat upper endoscopy in 12 weeks for surveillance based on pathology   results.   - Resume Xarelto (rivaroxaban) at prior dose in 3 days.   - The findings and recommendations were discussed with the patient's   family.  For questions, problems or results please call your physician - Mac Cobb MD at Work:  [phone].  [facility], EMERGENCY ROOM PHONE NUMBER: [phone]  IF A COMPLICATION OR EMERGENCY SITUATION ARISES AND YOU ARE UNABLE TO REACH   YOUR PHYSICIAN - GO DIRECTLY TO THE EMERGENCY ROOM.  Mac Cobb MD  3/31/2020 1:13:51 PM  This report has been verified and signed electronically.  [employer]

## 2020-03-31 NOTE — DISCHARGE INSTRUCTIONS

## 2020-03-31 NOTE — DISCHARGE SUMMARY
Discharge Summary/Instructions after an Endoscopic Procedure    Patient Name: Dick Lu  Patient MRN: [id number]  Patient YOB: 1934 Tuesday, March 31, 2020  Mac Cobb MD    RESTRICTIONS:  During your procedure today, you received medications for sedation.  These medications may affect your judgment, balance and coordination.  Therefore, for 24 hours, you have the following restrictions:     - DO NOT drive a car, operate machinery, make legal/financial decisions, sign important papers or drink alcohol.      ACTIVITY:  Today: no heavy lifting, straining or running due to procedural sedation/anesthesia.  The following day: return to full activity including work.    DIET:  Eat and drink normally unless instructed otherwise.     TREATMENT FOR COMMON SIDE EFFECTS:  - Mild abdominal pain, nausea, belching, bloating or excessive gas:  rest, eat lightly and use a heating pad.  - Sore Throat: treat with throat lozenges and/or gargle with warm salt water.  - Because air was used during the procedure, expelling large amounts of air from your rectum or belching is normal.  - If a bowel prep was taken, you may not have a bowel movement for 1-3 days.  This is normal.      SYMPTOMS TO WATCH FOR AND REPORT TO YOUR PHYSICIAN:  1. Abdominal pain or bloating, other than gas cramps.  2. Chest pain.  3. Back pain.  4. Signs of infection such as: chills or fever occurring within 24 hours after the procedure.  5. Rectal bleeding, which would show as bright red, maroon, or black stools. (A tablespoon of blood from the rectum is not serious, especially if hemorrhoids are present.)  6. Vomiting.  7. Weakness or dizziness.      GO DIRECTLY TO THE NEAREST EMERGENCY ROOM IF YOU HAVE ANY OF THE FOLLOWING:     Difficulty breathing              Chills and/or fever over 101 F   Persistent vomiting and/or vomiting blood   Severe abdominal pain   Severe chest pain   Black, tarry stools   Bleeding- more than one tablespoon   Any  other symptom or condition that you feel may need urgent attention    Your doctor recommends these additional instructions:  If any biopsies were taken, your doctors clinic will contact you in 1 to 2 weeks with any results.    - Discharge patient to home (ambulatory).   - Mechanical soft diet today, then advance as tolerated to advance diet as tolerated.   - Use Prilosec (omeprazole) 40 mg PO BID.   - Use sucralfate suspension 1 gram PO BID.   - Await pathology results.   - Repeat upper endoscopy in 12 weeks for surveillance based on pathology results.   - Resume Xarelto (rivaroxaban) at prior dose in 3 days.   - The findings and recommendations were discussed with the patient's family.    For questions, problems or results please call your physician - Mac Cobb MD at Work:  [phone].    [facility], EMERGENCY ROOM PHONE NUMBER: [phone]    IF A COMPLICATION OR EMERGENCY SITUATION ARISES AND YOU ARE UNABLE TO REACH YOUR PHYSICIAN - GO DIRECTLY TO THE EMERGENCY ROOM.

## 2020-03-31 NOTE — PLAN OF CARE
Discharge instructions given, patient verbalized understanding. Consents in chart, Vitals stable, no complaints. Paper prescriptions given to patient.

## 2020-03-31 NOTE — TRANSFER OF CARE
"Anesthesia Transfer of Care Note    Patient: Dick Lu    Procedure(s) Performed: Procedure(s) (LRB):  EGD (ESOPHAGOGASTRODUODENOSCOPY) (N/A)  DISSECTION, LESION, ESOPHAGUS, STOMACH, OR DUODENUM, SUBMUCOSAL, ENDOSCOPIC (N/A)    Patient location: PACU    Anesthesia Type: general    Transport from OR: Transported from OR on 6-10 L/min O2 by face mask with adequate spontaneous ventilation    Post pain: adequate analgesia    Post assessment: no apparent anesthetic complications    Post vital signs: stable    Level of consciousness: awake    Nausea/Vomiting: no nausea/vomiting    Complications: none    Transfer of care protocol was followed      Last vitals:   Visit Vitals  BP (!) 144/62 (BP Location: Right arm, Patient Position: Lying)   Pulse 71   Temp 36.6 °C (97.9 °F) (Temporal)   Resp 18   Ht 5' 10" (1.778 m)   Wt 93 kg (205 lb)   SpO2 96%   BMI 29.41 kg/m²     "

## 2020-04-01 NOTE — ANESTHESIA POSTPROCEDURE EVALUATION
Anesthesia Post Evaluation    Patient: Dick Lu    Procedure(s) Performed: Procedure(s) (LRB):  EGD (ESOPHAGOGASTRODUODENOSCOPY) (N/A)  DISSECTION, LESION, ESOPHAGUS, STOMACH, OR DUODENUM, SUBMUCOSAL, ENDOSCOPIC (N/A)    Final Anesthesia Type: general    Patient location during evaluation: PACU  Patient participation: Yes- Able to Participate  Level of consciousness: awake and alert  Post-procedure vital signs: reviewed and stable  Pain management: adequate  Airway patency: patent    PONV status at discharge: No PONV  Anesthetic complications: no      Cardiovascular status: stable  Respiratory status: unassisted and spontaneous ventilation  Hydration status: euvolemic  Follow-up not needed.          Vitals Value Taken Time   /58 3/31/2020  2:06 PM   Temp 36.5 °C (97.7 °F) 3/31/2020  2:05 PM   Pulse 60 3/31/2020  2:10 PM   Resp 25 3/31/2020  2:08 PM   SpO2 86 % 3/31/2020  2:10 PM   Vitals shown include unvalidated device data.      No case tracking events are documented in the log.      Pain/Zuly Score: Zuly Score: 10 (3/31/2020  2:00 PM)

## 2020-04-07 ENCOUNTER — TELEPHONE (OUTPATIENT)
Dept: ENDOSCOPY | Facility: HOSPITAL | Age: 85
End: 2020-04-07

## 2020-04-07 LAB
FINAL PATHOLOGIC DIAGNOSIS: NORMAL
GROSS: NORMAL